# Patient Record
Sex: MALE | Race: WHITE | Employment: UNEMPLOYED | URBAN - NONMETROPOLITAN AREA
[De-identification: names, ages, dates, MRNs, and addresses within clinical notes are randomized per-mention and may not be internally consistent; named-entity substitution may affect disease eponyms.]

---

## 2024-09-05 RX ORDER — HYDROCHLOROTHIAZIDE 25 MG/1
25 TABLET ORAL DAILY
COMMUNITY

## 2024-09-05 RX ORDER — AMLODIPINE BESYLATE 10 MG/1
10 TABLET ORAL DAILY
COMMUNITY

## 2024-09-05 RX ORDER — METOPROLOL TARTRATE 50 MG
50 TABLET ORAL 2 TIMES DAILY
COMMUNITY

## 2024-09-05 RX ORDER — WARFARIN SODIUM 10 MG/1
10 TABLET ORAL
COMMUNITY

## 2024-09-05 RX ORDER — PANTOPRAZOLE SODIUM 20 MG/1
20 TABLET, DELAYED RELEASE ORAL DAILY
COMMUNITY

## 2024-09-20 ENCOUNTER — OFFICE VISIT (OUTPATIENT)
Dept: CARDIOLOGY CLINIC | Age: 44
End: 2024-09-20
Payer: COMMERCIAL

## 2024-09-20 VITALS
SYSTOLIC BLOOD PRESSURE: 124 MMHG | WEIGHT: 300 LBS | HEART RATE: 100 BPM | BODY MASS INDEX: 36.53 KG/M2 | HEIGHT: 76 IN | DIASTOLIC BLOOD PRESSURE: 88 MMHG

## 2024-09-20 DIAGNOSIS — I48.11 LONGSTANDING PERSISTENT ATRIAL FIBRILLATION (HCC): ICD-10-CM

## 2024-09-20 DIAGNOSIS — I10 HYPERTENSION, UNSPECIFIED TYPE: Primary | ICD-10-CM

## 2024-09-20 DIAGNOSIS — Z82.49 FAMILY HISTORY OF EARLY CAD: ICD-10-CM

## 2024-09-20 DIAGNOSIS — R06.02 SHORTNESS OF BREATH: ICD-10-CM

## 2024-09-20 PROCEDURE — 3079F DIAST BP 80-89 MM HG: CPT | Performed by: CLINICAL NURSE SPECIALIST

## 2024-09-20 PROCEDURE — 93000 ELECTROCARDIOGRAM COMPLETE: CPT | Performed by: CLINICAL NURSE SPECIALIST

## 2024-09-20 PROCEDURE — 99203 OFFICE O/P NEW LOW 30 MIN: CPT | Performed by: CLINICAL NURSE SPECIALIST

## 2024-09-20 PROCEDURE — 3074F SYST BP LT 130 MM HG: CPT | Performed by: CLINICAL NURSE SPECIALIST

## 2024-09-20 RX ORDER — METOPROLOL SUCCINATE 200 MG/1
200 TABLET, EXTENDED RELEASE ORAL DAILY
COMMUNITY

## 2024-11-12 ENCOUNTER — HOSPITAL ENCOUNTER (OUTPATIENT)
Dept: NUCLEAR MEDICINE | Age: 44
Discharge: HOME | End: 2024-11-14
Payer: COMMERCIAL

## 2024-11-12 DIAGNOSIS — I48.11 LONGSTANDING PERSISTENT ATRIAL FIBRILLATION (HCC): ICD-10-CM

## 2024-11-12 DIAGNOSIS — R06.02 SHORTNESS OF BREATH: ICD-10-CM

## 2024-11-12 DIAGNOSIS — I10 HYPERTENSION, UNSPECIFIED TYPE: ICD-10-CM

## 2024-11-12 LAB
NUC STRESS EJECTION FRACTION: 44 %
STRESS BASELINE DIAS BP: 91 MMHG
STRESS BASELINE HR: 98 BPM
STRESS BASELINE SYS BP: 128 MMHG
STRESS ESTIMATED WORKLOAD: 1 METS
STRESS EXERCISE DUR MIN: 5 MIN
STRESS EXERCISE DUR SEC: 0 SEC
STRESS PEAK DIAS BP: 74 MMHG
STRESS PEAK SYS BP: 133 MMHG
STRESS PERCENT HR ACHIEVED: 71 %
STRESS POST PEAK HR: 125 BPM
STRESS RATE PRESSURE PRODUCT: NORMAL BPM*MMHG
STRESS STAGE 1 DURATION: 1 MIN:SEC
STRESS STAGE 1 HR: 91 BPM
STRESS STAGE 2 BP: NORMAL MMHG
STRESS STAGE 2 COMMENTS: NORMAL
STRESS STAGE 2 DURATION: 1 MIN:SEC
STRESS STAGE 2 HR: 114 BPM
STRESS STAGE 3 BP: NORMAL MMHG
STRESS STAGE 3 COMMENTS: NORMAL
STRESS STAGE 3 DURATION: 1 MIN:SEC
STRESS STAGE 3 HR: 125 BPM
STRESS STAGE 4 BP: NORMAL MMHG
STRESS STAGE 4 DURATION: 1 MIN:SEC
STRESS STAGE 4 HR: 98 BPM
STRESS STAGE 5 BP: NORMAL MMHG
STRESS STAGE 5 DURATION: 1 MIN:SEC
STRESS STAGE 5 HR: 105 BPM
STRESS STAGE RECOVERY 1 BP: NORMAL MMHG
STRESS STAGE RECOVERY 1 DURATION: 1 MIN:SEC
STRESS STAGE RECOVERY 1 HR: 107 BPM
STRESS TARGET HR: 177 BPM

## 2024-11-12 PROCEDURE — 93017 CV STRESS TEST TRACING ONLY: CPT

## 2024-11-12 PROCEDURE — A9500 TC99M SESTAMIBI: HCPCS | Performed by: CLINICAL NURSE SPECIALIST

## 2024-11-12 PROCEDURE — 6360000002 HC RX W HCPCS: Performed by: CLINICAL NURSE SPECIALIST

## 2024-11-12 PROCEDURE — 3430000000 HC RX DIAGNOSTIC RADIOPHARMACEUTICAL: Performed by: CLINICAL NURSE SPECIALIST

## 2024-11-12 RX ORDER — TETRAKIS(2-METHOXYISOBUTYLISOCYANIDE)COPPER(I) TETRAFLUOROBORATE 1 MG/ML
30 INJECTION, POWDER, LYOPHILIZED, FOR SOLUTION INTRAVENOUS
Status: COMPLETED | OUTPATIENT
Start: 2024-11-12 | End: 2024-11-12

## 2024-11-12 RX ORDER — TETRAKIS(2-METHOXYISOBUTYLISOCYANIDE)COPPER(I) TETRAFLUOROBORATE 1 MG/ML
10 INJECTION, POWDER, LYOPHILIZED, FOR SOLUTION INTRAVENOUS
Status: COMPLETED | OUTPATIENT
Start: 2024-11-12 | End: 2024-11-12

## 2024-11-12 RX ORDER — REGADENOSON 0.08 MG/ML
0.4 INJECTION, SOLUTION INTRAVENOUS
Status: COMPLETED | OUTPATIENT
Start: 2024-11-12 | End: 2024-11-12

## 2024-11-12 RX ADMIN — TETRAKIS(2-METHOXYISOBUTYLISOCYANIDE)COPPER(I) TETRAFLUOROBORATE 30 MILLICURIE: 1 INJECTION, POWDER, LYOPHILIZED, FOR SOLUTION INTRAVENOUS at 10:00

## 2024-11-12 RX ADMIN — REGADENOSON 0.4 MG: 0.08 INJECTION, SOLUTION INTRAVENOUS at 09:45

## 2024-11-12 RX ADMIN — TETRAKIS(2-METHOXYISOBUTYLISOCYANIDE)COPPER(I) TETRAFLUOROBORATE 10 MILLICURIE: 1 INJECTION, POWDER, LYOPHILIZED, FOR SOLUTION INTRAVENOUS at 08:25

## 2024-11-13 ENCOUNTER — TELEPHONE (OUTPATIENT)
Dept: CARDIOLOGY CLINIC | Age: 44
End: 2024-11-13

## 2024-11-13 DIAGNOSIS — R94.39 ABNORMAL CARDIOVASCULAR STRESS TEST: ICD-10-CM

## 2024-11-13 DIAGNOSIS — R06.02 SHORTNESS OF BREATH: Primary | ICD-10-CM

## 2024-11-13 NOTE — TELEPHONE ENCOUNTER
----- Message from Cyndi BENNETT sent at 11/13/2024  7:49 AM CST -----  Abnormal Rafia scan.  Please schedule heart cath with Dr. Oconnor  
shaved, cleaned, and numbed. A needle will be inserted into a blood vessel. A wire will be passed through the needle and into the blood vessel. The wire will then be guided through until it reaches your heart. A soft, flexible catheter tube will then be slipped over the wire and threaded up to your heart.  The doctor will be taking x-ray pictures during the procedure to know where the wire and catheter are. Dye will be injected into the arteries of the heart. This will make the arteries and heart show up on the x-ray images. You may feel warm during the dye injection.     Insertion of Catheter with Guide Wire    Once in place, the catheter can be used to take measurements. Blood pressure can be taken within the heart's different chambers. Blood samples may also be taken. Multiple x-ray images will be taken to look for any disease in the arteries. An aortogram may also be done at this time. This step will give a clear image of the aorta (large artery leaving the heart). Once all the tests and images are complete, the catheter will be removed.  Sometimes, the doctor will perform balloon angioplasty and stenting if he finds an area in your arteries that is narrow or clogged. These are procedures that help to open narrowed arteries.  Finally, a bandage will be placed over the groin or arm area.     How Long Will It Take?  The procedure takes about 1-2 hours. Preparation before the test will take another 1-2 hours.      How Much Will It Hurt?  Although the procedure is generally not painful, it can cause some discomfort, including:   Burning sensation (when skin at catheter insertion site is anesthetized)   Pressure when catheter is inserted or replaced with other catheters   A flushing feeling or nausea when the dye is injected   Headache   Heart palpitations   Pain medicine will be given when needed.     Average Hospital Stay:  0-1 days     [facility]   EKG and blood studies may be done.